# Patient Record
Sex: FEMALE | Race: BLACK OR AFRICAN AMERICAN | Employment: FULL TIME | URBAN - METROPOLITAN AREA
[De-identification: names, ages, dates, MRNs, and addresses within clinical notes are randomized per-mention and may not be internally consistent; named-entity substitution may affect disease eponyms.]

---

## 2022-01-07 DIAGNOSIS — Z01.419 WELL FEMALE EXAM WITH ROUTINE GYNECOLOGICAL EXAM: ICD-10-CM

## 2022-01-07 DIAGNOSIS — Z12.4 SCREENING FOR CERVICAL CANCER: ICD-10-CM

## 2023-10-16 ENCOUNTER — OFFICE VISIT (OUTPATIENT)
Dept: PRIMARY CARE CLINIC | Age: 43
End: 2023-10-16
Payer: COMMERCIAL

## 2023-10-16 VITALS
BODY MASS INDEX: 38.99 KG/M2 | DIASTOLIC BLOOD PRESSURE: 84 MMHG | WEIGHT: 248.4 LBS | OXYGEN SATURATION: 100 % | HEART RATE: 81 BPM | SYSTOLIC BLOOD PRESSURE: 126 MMHG | HEIGHT: 67 IN

## 2023-10-16 DIAGNOSIS — J45.20 MILD INTERMITTENT ASTHMA WITHOUT COMPLICATION: Primary | ICD-10-CM

## 2023-10-16 PROCEDURE — 1036F TOBACCO NON-USER: CPT | Performed by: STUDENT IN AN ORGANIZED HEALTH CARE EDUCATION/TRAINING PROGRAM

## 2023-10-16 PROCEDURE — G8427 DOCREV CUR MEDS BY ELIG CLIN: HCPCS | Performed by: STUDENT IN AN ORGANIZED HEALTH CARE EDUCATION/TRAINING PROGRAM

## 2023-10-16 PROCEDURE — G8417 CALC BMI ABV UP PARAM F/U: HCPCS | Performed by: STUDENT IN AN ORGANIZED HEALTH CARE EDUCATION/TRAINING PROGRAM

## 2023-10-16 PROCEDURE — 90674 CCIIV4 VAC NO PRSV 0.5 ML IM: CPT | Performed by: STUDENT IN AN ORGANIZED HEALTH CARE EDUCATION/TRAINING PROGRAM

## 2023-10-16 PROCEDURE — 99213 OFFICE O/P EST LOW 20 MIN: CPT | Performed by: STUDENT IN AN ORGANIZED HEALTH CARE EDUCATION/TRAINING PROGRAM

## 2023-10-16 PROCEDURE — G8482 FLU IMMUNIZE ORDER/ADMIN: HCPCS | Performed by: STUDENT IN AN ORGANIZED HEALTH CARE EDUCATION/TRAINING PROGRAM

## 2023-10-16 PROCEDURE — 90471 IMMUNIZATION ADMIN: CPT | Performed by: STUDENT IN AN ORGANIZED HEALTH CARE EDUCATION/TRAINING PROGRAM

## 2023-10-16 RX ORDER — CETIRIZINE HYDROCHLORIDE 10 MG/1
10 TABLET ORAL DAILY
COMMUNITY

## 2023-10-16 RX ORDER — ALBUTEROL SULFATE 90 UG/1
2 AEROSOL, METERED RESPIRATORY (INHALATION) 4 TIMES DAILY PRN
Qty: 18 G | Refills: 0 | Status: SHIPPED | OUTPATIENT
Start: 2023-10-16

## 2023-10-16 NOTE — PROGRESS NOTES
[address] [facility] PRIMARY AND WALK-IN CARE  [facility]  Nino Macdonald [place] [address] [place] [address]  Dept: [phone]  Dept Fax: [phone]  Loc: [phone]     10/16/2023    Visit type: walk in     Reason for Visit: Shortness of Breath (Had an episode at school with perfumes made her to where she could not breathe)       ASSESSMENT/PLAN   1. Mild intermittent asthma without complication  Assessment & Plan:   Acute , new issue   - no hx of official diagnosis --> PFT ordered   - hx of exercise induced asthma when younger   - has been having whats described as asthma attacks--> albuterol inhaler rescue ordered   Orders:  -     Spirometry With Bronchodilator; Future  -     albuterol sulfate HFA (VENTOLIN HFA) 108 (90 Base) MCG/ACT inhaler; Inhale 2 puffs into the lungs 4 times daily as needed for Wheezing, Disp-18 g, R-0Normal    No follow-ups on file. Orders Placed This Encounter   Medications    albuterol sulfate HFA (VENTOLIN HFA) 108 (90 Base) MCG/ACT inhaler     Sig: Inhale 2 puffs into the lungs 4 times daily as needed for Wheezing     Dispense:  18 g     Refill:  0      Subjective    Patient: Gerson Canada is a 37 y.o. female   HPI:    Has hx of seasonal allergies. Takes zytec and flonase once a day. 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 Takes hours to get back to normal. Feels like she cant breath during attacks. Has hx of sports induced asthma. Does not have inhalers at home. Review of Systems   All other systems reviewed and are negative. Physical Exam  Constitutional:       Appearance: Normal appearance. She is obese. Cardiovascular:      Rate and Rhythm: Normal rate. Pulses: Normal pulses. Pulmonary:      Effort: Pulmonary effort is normal.      Breath sounds: No wheezing. Neurological:      Mental Status: She is alert.        Allergies   Allergen Reactions    Latex Itching       Current

## 2023-10-16 NOTE — ASSESSMENT & PLAN NOTE
Acute , new issue   - no hx of official diagnosis --> PFT ordered   - hx of exercise induced asthma when younger   - has been having whats described as asthma attacks--> albuterol inhaler rescue ordered

## 2024-04-09 ENCOUNTER — OFFICE VISIT (OUTPATIENT)
Dept: FAMILY MEDICINE CLINIC | Age: 44
End: 2024-04-09
Payer: COMMERCIAL

## 2024-04-09 VITALS
DIASTOLIC BLOOD PRESSURE: 78 MMHG | HEART RATE: 84 BPM | WEIGHT: 248 LBS | BODY MASS INDEX: 38.92 KG/M2 | TEMPERATURE: 97.3 F | HEIGHT: 67 IN | SYSTOLIC BLOOD PRESSURE: 132 MMHG | OXYGEN SATURATION: 100 %

## 2024-04-09 DIAGNOSIS — Z13.220 LIPID SCREENING: ICD-10-CM

## 2024-04-09 DIAGNOSIS — R73.03 PREDIABETES: ICD-10-CM

## 2024-04-09 DIAGNOSIS — E55.9 VITAMIN D DEFICIENCY: Primary | ICD-10-CM

## 2024-04-09 DIAGNOSIS — N89.8 VAGINAL DRYNESS: ICD-10-CM

## 2024-04-09 DIAGNOSIS — E66.09 CLASS 2 OBESITY DUE TO EXCESS CALORIES WITH BODY MASS INDEX (BMI) OF 38.0 TO 38.9 IN ADULT, UNSPECIFIED WHETHER SERIOUS COMORBIDITY PRESENT: ICD-10-CM

## 2024-04-09 DIAGNOSIS — E55.9 VITAMIN D DEFICIENCY: ICD-10-CM

## 2024-04-09 DIAGNOSIS — N95.1 PERIMENOPAUSAL: ICD-10-CM

## 2024-04-09 DIAGNOSIS — F32.A DEPRESSION, UNSPECIFIED DEPRESSION TYPE: ICD-10-CM

## 2024-04-09 DIAGNOSIS — E61.1 IRON DEFICIENCY: ICD-10-CM

## 2024-04-09 LAB
ALBUMIN SERPL-MCNC: 4.3 G/DL (ref 3.5–4.6)
ALP SERPL-CCNC: 89 U/L (ref 40–130)
ALT SERPL-CCNC: 18 U/L (ref 0–33)
ANION GAP SERPL CALCULATED.3IONS-SCNC: 11 MEQ/L (ref 9–15)
AST SERPL-CCNC: 15 U/L (ref 0–35)
BASOPHILS # BLD: 0.1 K/UL (ref 0–0.2)
BASOPHILS NFR BLD: 0.5 %
BILIRUB SERPL-MCNC: 0.3 MG/DL (ref 0.2–0.7)
BUN SERPL-MCNC: 10 MG/DL (ref 6–20)
CALCIUM SERPL-MCNC: 9.1 MG/DL (ref 8.5–9.9)
CHLORIDE SERPL-SCNC: 103 MEQ/L (ref 95–107)
CHOLEST SERPL-MCNC: 178 MG/DL (ref 0–199)
CO2 SERPL-SCNC: 22 MEQ/L (ref 20–31)
CREAT SERPL-MCNC: 0.74 MG/DL (ref 0.5–0.9)
EOSINOPHIL # BLD: 0.1 K/UL (ref 0–0.7)
EOSINOPHIL NFR BLD: 1.3 %
ERYTHROCYTE [DISTWIDTH] IN BLOOD BY AUTOMATED COUNT: 14.9 % (ref 11.5–14.5)
GLOBULIN SER CALC-MCNC: 3.2 G/DL (ref 2.3–3.5)
GLUCOSE SERPL-MCNC: 88 MG/DL (ref 70–99)
HCT VFR BLD AUTO: 39.4 % (ref 37–47)
HDLC SERPL-MCNC: 59 MG/DL (ref 40–59)
HGB BLD-MCNC: 12.5 G/DL (ref 12–16)
LDL CHOLESTEROL CALCULATED: 100 MG/DL (ref 0–129)
LYMPHOCYTES # BLD: 3 K/UL (ref 1–4.8)
LYMPHOCYTES NFR BLD: 28.7 %
MCH RBC QN AUTO: 25.9 PG (ref 27–31.3)
MCHC RBC AUTO-ENTMCNC: 31.7 % (ref 33–37)
MCV RBC AUTO: 81.6 FL (ref 79.4–94.8)
MONOCYTES # BLD: 0.6 K/UL (ref 0.2–0.8)
MONOCYTES NFR BLD: 5.6 %
NEUTROPHILS # BLD: 6.5 K/UL (ref 1.4–6.5)
NEUTS SEG NFR BLD: 63.3 %
PLATELET # BLD AUTO: 293 K/UL (ref 130–400)
POTASSIUM SERPL-SCNC: 4.2 MEQ/L (ref 3.4–4.9)
PROT SERPL-MCNC: 7.5 G/DL (ref 6.3–8)
RBC # BLD AUTO: 4.83 M/UL (ref 4.2–5.4)
SODIUM SERPL-SCNC: 136 MEQ/L (ref 135–144)
TRIGLYCERIDE, FASTING: 93 MG/DL (ref 0–150)
WBC # BLD AUTO: 10.3 K/UL (ref 4.8–10.8)

## 2024-04-09 PROCEDURE — G8417 CALC BMI ABV UP PARAM F/U: HCPCS | Performed by: FAMILY MEDICINE

## 2024-04-09 PROCEDURE — 1036F TOBACCO NON-USER: CPT | Performed by: FAMILY MEDICINE

## 2024-04-09 PROCEDURE — G8427 DOCREV CUR MEDS BY ELIG CLIN: HCPCS | Performed by: FAMILY MEDICINE

## 2024-04-09 PROCEDURE — 99214 OFFICE O/P EST MOD 30 MIN: CPT | Performed by: FAMILY MEDICINE

## 2024-04-09 RX ORDER — AMLODIPINE BESYLATE 5 MG/1
5 TABLET ORAL DAILY
COMMUNITY
Start: 2024-04-05 | End: 2024-04-09 | Stop reason: SDUPTHER

## 2024-04-09 RX ORDER — AMLODIPINE BESYLATE 5 MG/1
5 TABLET ORAL DAILY
Qty: 30 TABLET | Refills: 2 | Status: SHIPPED | OUTPATIENT
Start: 2024-04-09 | End: 2024-07-08

## 2024-04-09 ASSESSMENT — PATIENT HEALTH QUESTIONNAIRE - PHQ9
6. FEELING BAD ABOUT YOURSELF - OR THAT YOU ARE A FAILURE OR HAVE LET YOURSELF OR YOUR FAMILY DOWN: MORE THAN HALF THE DAYS
4. FEELING TIRED OR HAVING LITTLE ENERGY: MORE THAN HALF THE DAYS
9. THOUGHTS THAT YOU WOULD BE BETTER OFF DEAD, OR OF HURTING YOURSELF: NOT AT ALL
SUM OF ALL RESPONSES TO PHQ QUESTIONS 1-9: 12
SUM OF ALL RESPONSES TO PHQ QUESTIONS 1-9: 12
SUM OF ALL RESPONSES TO PHQ9 QUESTIONS 1 & 2: 4
1. LITTLE INTEREST OR PLEASURE IN DOING THINGS: MORE THAN HALF THE DAYS
8. MOVING OR SPEAKING SO SLOWLY THAT OTHER PEOPLE COULD HAVE NOTICED. OR THE OPPOSITE - BEING SO FIDGETY OR RESTLESS THAT YOU HAVE BEEN MOVING AROUND A LOT MORE THAN USUAL: NOT AT ALL
9. THOUGHTS THAT YOU WOULD BE BETTER OFF DEAD, OR OF HURTING YOURSELF: NOT AT ALL
SUM OF ALL RESPONSES TO PHQ QUESTIONS 1-9: 12
SUM OF ALL RESPONSES TO PHQ QUESTIONS 1-9: 12
5. POOR APPETITE OR OVEREATING: SEVERAL DAYS
8. MOVING OR SPEAKING SO SLOWLY THAT OTHER PEOPLE COULD HAVE NOTICED. OR THE OPPOSITE, BEING SO FIGETY OR RESTLESS THAT YOU HAVE BEEN MOVING AROUND A LOT MORE THAN USUAL: NOT AT ALL
4. FEELING TIRED OR HAVING LITTLE ENERGY: MORE THAN HALF THE DAYS
1. LITTLE INTEREST OR PLEASURE IN DOING THINGS: MORE THAN HALF THE DAYS
2. FEELING DOWN, DEPRESSED OR HOPELESS: MORE THAN HALF THE DAYS
SUM OF ALL RESPONSES TO PHQ9 QUESTIONS 1 & 2: 4
7. TROUBLE CONCENTRATING ON THINGS, SUCH AS READING THE NEWSPAPER OR WATCHING TELEVISION: SEVERAL DAYS
7. TROUBLE CONCENTRATING ON THINGS, SUCH AS READING THE NEWSPAPER OR WATCHING TELEVISION: SEVERAL DAYS
10. IF YOU CHECKED OFF ANY PROBLEMS, HOW DIFFICULT HAVE THESE PROBLEMS MADE IT FOR YOU TO DO YOUR WORK, TAKE CARE OF THINGS AT HOME, OR GET ALONG WITH OTHER PEOPLE: VERY DIFFICULT
3. TROUBLE FALLING OR STAYING ASLEEP: MORE THAN HALF THE DAYS
2. FEELING DOWN, DEPRESSED OR HOPELESS: MORE THAN HALF THE DAYS
SUM OF ALL RESPONSES TO PHQ QUESTIONS 1-9: 12
6. FEELING BAD ABOUT YOURSELF - OR THAT YOU ARE A FAILURE OR HAVE LET YOURSELF OR YOUR FAMILY DOWN: MORE THAN HALF THE DAYS
3. TROUBLE FALLING OR STAYING ASLEEP: MORE THAN HALF THE DAYS
5. POOR APPETITE OR OVEREATING: SEVERAL DAYS
10. IF YOU CHECKED OFF ANY PROBLEMS, HOW DIFFICULT HAVE THESE PROBLEMS MADE IT FOR YOU TO DO YOUR WORK, TAKE CARE OF THINGS AT HOME, OR GET ALONG WITH OTHER PEOPLE: VERY DIFFICULT

## 2024-04-09 NOTE — PROGRESS NOTES
Chief Complaint   Patient presents with    Hypertension     Patient saw  at [place] 4/5/24 with bp of 240/140. Has started to monitor bp this weekend, 160s/90-100s. She has been having light heart palpitations, dizziness, and headaches with the high bp. Bp in office 132/78, she took amlodipine this morning        HPI: Ashley Krishna 43 y.o. female presenting for     ED follow-up  Patient was seen at [facility] for elevated blood pressures.  Patient reports she was at the school when she started to notice heart palpitations and dizziness.  School nurse checked her blood pressure and found it to be very high.  Subsequently patient was sent to the emergency department.  Workup in the ED was negative.  Patient was started on amlodipine 5 mg daily.  Patient reports since doing on the medication she has noticed the palpitations improved.  Denies any lower extremity edema.    Not feeling herself  Patient reports that she has not been feeling herself.  Admits that her body has been aching and feeling itchy.  Admits to irritability and fatigue.  Patient also reports of hot flashes and vaginal dryness.  Patient did have a hysterectomy several years ago where the uterus was removed but the ovaries were retained.  Patient reports her symptoms been going on for the last year and have been worsening.  Patient does admit to depressed mood which has worsened over the last 3 months.  Patient is unsure when her mother went through menopause.      Asthma attack  Patient reports that has been tach when she was exposed that she perfume in the school.  Using albuterol inhaler as needed.      Iron deficiency 2/2 to Vaginal bleeding s/p hysterectomy   Hysterectomy in march due to vaginal bleeding   Patient would like to make sure that her levels are good.      F/u   Would like iron levels checked     Mitral valve prolapse   Had an EKG   No issues       Current Outpatient Medications   Medication Sig Dispense Refill

## 2024-04-10 LAB
ESTIMATED AVERAGE GLUCOSE: 117 MG/DL
FERRITIN: 53 NG/ML (ref 13–150)
HBA1C MFR BLD: 5.7 % (ref 4–6)
IRON % SATURATION: 27 % (ref 20–55)
IRON: 83 UG/DL (ref 37–145)
TOTAL IRON BINDING CAPACITY: 305 UG/DL (ref 250–450)
UNSATURATED IRON BINDING CAPACITY: 222 UG/DL (ref 112–347)
VITAMIN D 25-HYDROXY: 24.8 NG/ML (ref 30–100)

## 2024-04-18 ENCOUNTER — TELEPHONE (OUTPATIENT)
Dept: FAMILY MEDICINE CLINIC | Age: 44
End: 2024-04-18

## 2024-04-18 NOTE — TELEPHONE ENCOUNTER
----- Message from Andree Tompkins sent at 4/18/2024  9:45 AM EDT -----    ----- Message -----  From: Caty Luevano MD  Sent: 4/18/2024   9:40 AM EDT  To: Grecia Hollins  Clinical Staff    Please call lab, for some reason patient hormonal levels were not done.  Please see if there was any reason for why they were not done with the rest of her blood work.  Thanks    Please let patient know that she may need to get her blood work done for the hormone levels (depending on what lab says)

## 2024-04-18 NOTE — TELEPHONE ENCOUNTER
Spoke with Adrian from lab, it was an issue on their end with their software crossing from ours to theirs, Adrian will call the patient to come back and have those redrawn.

## 2024-04-22 LAB
PROLACTIN SERPL-MCNC: 11.7 NG/ML
TSH REFLEX: 1.15 UIU/ML (ref 0.44–3.86)

## 2024-04-23 LAB
FOLLICLE STIMULATING HORMONE: 23.9 MIU/ML
LH: 11.4 MIU/ML (ref 1.7–8.6)

## 2024-04-24 DIAGNOSIS — N95.1 PERIMENOPAUSAL SYMPTOMS: Primary | ICD-10-CM

## 2024-04-24 RX ORDER — ESTRADIOL 0.03 MG/D
1 FILM, EXTENDED RELEASE TRANSDERMAL
Qty: 8 PATCH | Refills: 3 | Status: SHIPPED | OUTPATIENT
Start: 2024-04-25

## 2024-05-10 DIAGNOSIS — N95.1 PERIMENOPAUSAL SYMPTOMS: ICD-10-CM

## 2024-05-10 RX ORDER — ESTRADIOL 0.03 MG/D
1 FILM, EXTENDED RELEASE TRANSDERMAL
Qty: 8 PATCH | Refills: 3 | Status: SHIPPED | OUTPATIENT
Start: 2024-05-13

## 2024-05-17 ENCOUNTER — OFFICE VISIT (OUTPATIENT)
Dept: OBGYN CLINIC | Age: 44
End: 2024-05-17
Payer: COMMERCIAL

## 2024-05-17 VITALS
SYSTOLIC BLOOD PRESSURE: 146 MMHG | WEIGHT: 252 LBS | HEIGHT: 67 IN | DIASTOLIC BLOOD PRESSURE: 94 MMHG | BODY MASS INDEX: 39.55 KG/M2

## 2024-05-17 DIAGNOSIS — R03.0 ELEVATED BP WITHOUT DIAGNOSIS OF HYPERTENSION: ICD-10-CM

## 2024-05-17 DIAGNOSIS — R23.2 HOT FLASHES: Primary | ICD-10-CM

## 2024-05-17 DIAGNOSIS — R52 BODY ACHES: ICD-10-CM

## 2024-05-17 PROCEDURE — 99213 OFFICE O/P EST LOW 20 MIN: CPT | Performed by: OBSTETRICS & GYNECOLOGY

## 2024-05-17 PROCEDURE — 1036F TOBACCO NON-USER: CPT | Performed by: OBSTETRICS & GYNECOLOGY

## 2024-05-17 PROCEDURE — G8417 CALC BMI ABV UP PARAM F/U: HCPCS | Performed by: OBSTETRICS & GYNECOLOGY

## 2024-05-17 PROCEDURE — G8427 DOCREV CUR MEDS BY ELIG CLIN: HCPCS | Performed by: OBSTETRICS & GYNECOLOGY

## 2024-06-06 ENCOUNTER — OFFICE VISIT (OUTPATIENT)
Dept: FAMILY MEDICINE CLINIC | Age: 44
End: 2024-06-06
Payer: COMMERCIAL

## 2024-06-06 VITALS
HEIGHT: 67 IN | DIASTOLIC BLOOD PRESSURE: 62 MMHG | TEMPERATURE: 97 F | BODY MASS INDEX: 40.18 KG/M2 | OXYGEN SATURATION: 98 % | SYSTOLIC BLOOD PRESSURE: 126 MMHG | WEIGHT: 256 LBS | HEART RATE: 86 BPM

## 2024-06-06 DIAGNOSIS — R73.03 PREDIABETES: ICD-10-CM

## 2024-06-06 DIAGNOSIS — I10 HYPERTENSION, UNSPECIFIED TYPE: ICD-10-CM

## 2024-06-06 DIAGNOSIS — R52 BODY ACHES: ICD-10-CM

## 2024-06-06 DIAGNOSIS — N89.8 VAGINAL DRYNESS: ICD-10-CM

## 2024-06-06 DIAGNOSIS — N95.1 PERIMENOPAUSAL: ICD-10-CM

## 2024-06-06 DIAGNOSIS — R03.0 ELEVATED BP WITHOUT DIAGNOSIS OF HYPERTENSION: ICD-10-CM

## 2024-06-06 DIAGNOSIS — R23.2 HOT FLASHES: ICD-10-CM

## 2024-06-06 DIAGNOSIS — E55.9 VITAMIN D DEFICIENCY: ICD-10-CM

## 2024-06-06 DIAGNOSIS — E66.09 CLASS 2 OBESITY DUE TO EXCESS CALORIES WITH BODY MASS INDEX (BMI) OF 38.0 TO 38.9 IN ADULT, UNSPECIFIED WHETHER SERIOUS COMORBIDITY PRESENT: ICD-10-CM

## 2024-06-06 DIAGNOSIS — E61.1 IRON DEFICIENCY: ICD-10-CM

## 2024-06-06 DIAGNOSIS — M25.472 LEFT ANKLE SWELLING: ICD-10-CM

## 2024-06-06 DIAGNOSIS — E55.9 VITAMIN D DEFICIENCY: Primary | ICD-10-CM

## 2024-06-06 LAB — VITAMIN D 25-HYDROXY: 30.8 NG/ML (ref 30–100)

## 2024-06-06 PROCEDURE — 1036F TOBACCO NON-USER: CPT | Performed by: FAMILY MEDICINE

## 2024-06-06 PROCEDURE — 3078F DIAST BP <80 MM HG: CPT | Performed by: FAMILY MEDICINE

## 2024-06-06 PROCEDURE — G8427 DOCREV CUR MEDS BY ELIG CLIN: HCPCS | Performed by: FAMILY MEDICINE

## 2024-06-06 PROCEDURE — 99214 OFFICE O/P EST MOD 30 MIN: CPT | Performed by: FAMILY MEDICINE

## 2024-06-06 PROCEDURE — G8417 CALC BMI ABV UP PARAM F/U: HCPCS | Performed by: FAMILY MEDICINE

## 2024-06-06 PROCEDURE — 3074F SYST BP LT 130 MM HG: CPT | Performed by: FAMILY MEDICINE

## 2024-06-06 RX ORDER — ESTRADIOL 0.05 MG/D
1 PATCH, EXTENDED RELEASE TRANSDERMAL
Qty: 8 PATCH | Refills: 3 | Status: CANCELLED | OUTPATIENT
Start: 2024-06-06

## 2024-06-06 RX ORDER — BECLOMETHASONE DIPROPIONATE HFA 40 UG/1
1 AEROSOL, METERED RESPIRATORY (INHALATION)
COMMUNITY
Start: 2024-04-29

## 2024-06-06 RX ORDER — AMLODIPINE BESYLATE 10 MG/1
10 TABLET ORAL DAILY
Qty: 90 TABLET | Refills: 1 | Status: SHIPPED | OUTPATIENT
Start: 2024-06-06

## 2024-06-06 NOTE — PROGRESS NOTES
Chief Complaint   Patient presents with    Follow-up     2 month follow-up, patient reports same issues since last visit.     Health Maintenance     Yes to mammogram        HPI: Ashley Krishna 43 y.o. female presenting for     Hypertension   Stable on the increased dose of amlodipine  Currently on 10 mg daily  BP Readings from Last 29 Encounters:   06/06/24 126/62   05/17/24 (!) 146/94   04/09/24 132/78   10/16/23 126/84   07/06/23 134/82   06/08/23 128/76   10/10/22 118/74   09/13/22 124/74   08/16/22 130/86   04/08/22 114/72   03/16/22 112/84   03/09/22 132/73   03/07/22 (!) 152/94   03/01/22 (!) 154/72   02/24/22 130/78   01/06/22 126/64   03/06/21 (!) 163/101   08/05/18 (!) 156/84   ]    Not feeling herself  Patient reports that she has not been feeling herself.  Admits that her body has been aching and feeling itchy.  Admits to irritability and fatigue.  Patient also reports of hot flashes and vaginal dryness.  Patient did have a hysterectomy several years ago where the uterus was removed but the ovaries were retained.  Patient reports her symptoms been going on for the last year and have been worsening.  Patient does admit to depressed mood which has worsened over the last 3 months.  Patient is unsure when her mother went through menopause.    F/u   No change   Did see gyn, wants her to do more blood work before they think about starting estrogen patch    Left ankle swelling  Patient is complaining of ankle pain and swollen for 1 year   Patinet reports that she feels it more sore in the morning and in the evening and goes away.   Does not feel like a sprain.   Patient was started on amlodipine way after this happened.     Asthma attack  Patient reports that has been tach when she was exposed that she perfume in the school.  Using albuterol inhaler as needed.  F/u   Stable     Iron deficiency 2/2 to Vaginal bleeding s/p hysterectomy   Hysterectomy in march due to vaginal bleeding   Patient would like to

## 2024-06-07 LAB
CORTISOL COLLECTION INFO: NORMAL
CORTISOL: 6.9 UG/DL (ref 2.5–19.5)
ESTRADIOL LEVEL: 40 PG/ML

## 2024-06-08 LAB — NUCLEAR IGG SER QL IA: NORMAL

## 2024-06-09 LAB — THYROPEROXIDASE IGG SERPL-ACNC: <4 IU/ML (ref 0–25)

## 2024-06-10 DIAGNOSIS — M25.472 LEFT ANKLE SWELLING: Primary | ICD-10-CM

## 2024-06-22 SDOH — ECONOMIC STABILITY: FOOD INSECURITY: WITHIN THE PAST 12 MONTHS, YOU WORRIED THAT YOUR FOOD WOULD RUN OUT BEFORE YOU GOT MONEY TO BUY MORE.: NEVER TRUE

## 2024-06-22 SDOH — ECONOMIC STABILITY: TRANSPORTATION INSECURITY
IN THE PAST 12 MONTHS, HAS LACK OF TRANSPORTATION KEPT YOU FROM MEETINGS, WORK, OR FROM GETTING THINGS NEEDED FOR DAILY LIVING?: NO

## 2024-06-22 SDOH — ECONOMIC STABILITY: INCOME INSECURITY: HOW HARD IS IT FOR YOU TO PAY FOR THE VERY BASICS LIKE FOOD, HOUSING, MEDICAL CARE, AND HEATING?: NOT VERY HARD

## 2024-06-22 SDOH — ECONOMIC STABILITY: HOUSING INSECURITY
IN THE LAST 12 MONTHS, WAS THERE A TIME WHEN YOU DID NOT HAVE A STEADY PLACE TO SLEEP OR SLEPT IN A SHELTER (INCLUDING NOW)?: NO

## 2024-06-22 SDOH — ECONOMIC STABILITY: FOOD INSECURITY: WITHIN THE PAST 12 MONTHS, THE FOOD YOU BOUGHT JUST DIDN'T LAST AND YOU DIDN'T HAVE MONEY TO GET MORE.: NEVER TRUE

## 2024-06-25 ENCOUNTER — OFFICE VISIT (OUTPATIENT)
Dept: OBGYN CLINIC | Age: 44
End: 2024-06-25
Payer: COMMERCIAL

## 2024-06-25 VITALS
BODY MASS INDEX: 40.02 KG/M2 | WEIGHT: 255 LBS | HEIGHT: 67 IN | SYSTOLIC BLOOD PRESSURE: 122 MMHG | DIASTOLIC BLOOD PRESSURE: 76 MMHG

## 2024-06-25 DIAGNOSIS — R23.2 HOT FLASHES: Primary | ICD-10-CM

## 2024-06-25 PROCEDURE — 99212 OFFICE O/P EST SF 10 MIN: CPT | Performed by: OBSTETRICS & GYNECOLOGY

## 2024-06-25 PROCEDURE — G8417 CALC BMI ABV UP PARAM F/U: HCPCS | Performed by: OBSTETRICS & GYNECOLOGY

## 2024-06-25 PROCEDURE — G8427 DOCREV CUR MEDS BY ELIG CLIN: HCPCS | Performed by: OBSTETRICS & GYNECOLOGY

## 2024-06-25 PROCEDURE — 1036F TOBACCO NON-USER: CPT | Performed by: OBSTETRICS & GYNECOLOGY

## 2024-07-29 ENCOUNTER — INITIAL CONSULT (OUTPATIENT)
Dept: PODIATRY | Age: 44
End: 2024-07-29
Payer: COMMERCIAL

## 2024-07-29 VITALS — BODY MASS INDEX: 40.81 KG/M2 | TEMPERATURE: 97.9 F | HEIGHT: 67 IN | WEIGHT: 260 LBS

## 2024-07-29 DIAGNOSIS — M76.822 POSTERIOR TIBIAL TENDON DYSFUNCTION (PTTD) OF LEFT LOWER EXTREMITY: ICD-10-CM

## 2024-07-29 DIAGNOSIS — M79.672 PAIN IN BOTH FEET: Primary | ICD-10-CM

## 2024-07-29 DIAGNOSIS — M19.071 ARTHRITIS OF BOTH FEET: ICD-10-CM

## 2024-07-29 DIAGNOSIS — M65.872 OTHER SYNOVITIS AND TENOSYNOVITIS, LEFT ANKLE AND FOOT: ICD-10-CM

## 2024-07-29 DIAGNOSIS — M21.42 PES PLANUS OF BOTH FEET: ICD-10-CM

## 2024-07-29 DIAGNOSIS — M79.671 PAIN IN BOTH FEET: Primary | ICD-10-CM

## 2024-07-29 DIAGNOSIS — M19.072 ARTHRITIS OF BOTH FEET: ICD-10-CM

## 2024-07-29 DIAGNOSIS — M21.41 PES PLANUS OF BOTH FEET: ICD-10-CM

## 2024-07-29 PROCEDURE — 99203 OFFICE O/P NEW LOW 30 MIN: CPT | Performed by: PODIATRIST

## 2024-07-29 PROCEDURE — 1036F TOBACCO NON-USER: CPT | Performed by: PODIATRIST

## 2024-07-29 PROCEDURE — G8417 CALC BMI ABV UP PARAM F/U: HCPCS | Performed by: PODIATRIST

## 2024-07-29 PROCEDURE — G8427 DOCREV CUR MEDS BY ELIG CLIN: HCPCS | Performed by: PODIATRIST

## 2024-07-29 RX ORDER — METHYLPREDNISOLONE 4 MG/1
TABLET ORAL
Qty: 1 KIT | Refills: 0 | Status: SHIPPED | OUTPATIENT
Start: 2024-07-29

## 2024-07-29 ASSESSMENT — ENCOUNTER SYMPTOMS
NAUSEA: 0
SHORTNESS OF BREATH: 0
BACK PAIN: 1
VOMITING: 0

## 2024-07-30 RX ORDER — AMLODIPINE BESYLATE 5 MG/1
5 TABLET ORAL DAILY
Qty: 90 TABLET | Refills: 0 | OUTPATIENT
Start: 2024-07-30

## 2024-07-30 NOTE — PROGRESS NOTES
[facility] PHYSICIANS [place] SPECIALTY CARE, [facility] PODIATRY  [address] [facility] [address]  Dept: [phone]  Loc: [phone]       Ashley Krishna  (1980)    7/29/24    Subjective     Ashley Krishna is 44 y.o. female who complains today of:    Chief Complaint   Patient presents with    Ankle Pain     Left ankle     Ashley Krishna is seen in consultation at the request of Caty Luevano MD for evaluation of foot pain.     Ankle Pain   Pertinent negatives include no numbness.     HPI: Patient presents with a complaint of bilateral foot pain, she states the left is significantly more painful than the right.  Patient points to the medial rear foot and the ankle as the areas of pain.  Patient describes having stiffness and achiness to the joints with shooting pain during activity.  Patient also reports having swelling of the lower extremities after prolonged standing.  Patient works as a  at a middle school, this requires her to stand for extended periods.  Patient rates her pain level as 7/10.  Patient states that the symptoms have been present for the past 4 years but have been waxing and waning.  Overall, she states that the pain level has worsened over time.  Patient does not recall injury but states she was very active in track.  Prior treatments included medication, inserts and a compression wrap.  Patient states she has not had custom orthotics or braces in the past.    Review of Systems   Constitutional:  Negative for chills and fever.   HENT:  Negative for hearing loss.    Respiratory:  Negative for shortness of breath.    Cardiovascular:  Negative for chest pain.   Gastrointestinal:  Negative for nausea and vomiting.   Genitourinary:  Negative for difficulty urinating.   Musculoskeletal:  Positive for arthralgias, back pain and gait problem.   Skin:  Negative for wound.   Neurological:  Negative for numbness.

## 2024-08-14 ENCOUNTER — TELEPHONE (OUTPATIENT)
Dept: PODIATRY | Age: 44
End: 2024-08-14

## 2024-08-25 ENCOUNTER — PATIENT MESSAGE (OUTPATIENT)
Dept: OBGYN CLINIC | Age: 44
End: 2024-08-25

## 2024-08-26 NOTE — TELEPHONE ENCOUNTER
Requested Prescriptions     Pending Prescriptions Disp Refills    norethindrone-ethinyl estradiol-Fe (LO LOESTRIN FE) 1 MG-10 MCG / 10 MCG tablet 1 packet 5     Sig: Take 1 tablet by mouth daily

## 2024-09-18 ENCOUNTER — OFFICE VISIT (OUTPATIENT)
Dept: PODIATRY | Age: 44
End: 2024-09-18
Payer: COMMERCIAL

## 2024-09-18 VITALS — BODY MASS INDEX: 40.81 KG/M2 | WEIGHT: 260 LBS | HEIGHT: 67 IN | TEMPERATURE: 97.5 F

## 2024-09-18 DIAGNOSIS — M19.071 ARTHRITIS OF BOTH FEET: ICD-10-CM

## 2024-09-18 DIAGNOSIS — M21.41 PES PLANUS OF BOTH FEET: ICD-10-CM

## 2024-09-18 DIAGNOSIS — M79.672 PAIN IN BOTH FEET: Primary | ICD-10-CM

## 2024-09-18 DIAGNOSIS — M21.42 PES PLANUS OF BOTH FEET: ICD-10-CM

## 2024-09-18 DIAGNOSIS — M19.072 ARTHRITIS OF BOTH FEET: ICD-10-CM

## 2024-09-18 DIAGNOSIS — M76.822 POSTERIOR TIBIAL TENDON DYSFUNCTION (PTTD) OF LEFT LOWER EXTREMITY: ICD-10-CM

## 2024-09-18 DIAGNOSIS — M79.671 PAIN IN BOTH FEET: Primary | ICD-10-CM

## 2024-09-18 PROCEDURE — 99213 OFFICE O/P EST LOW 20 MIN: CPT | Performed by: PODIATRIST

## 2024-09-18 PROCEDURE — G8417 CALC BMI ABV UP PARAM F/U: HCPCS | Performed by: PODIATRIST

## 2024-09-18 PROCEDURE — 1036F TOBACCO NON-USER: CPT | Performed by: PODIATRIST

## 2024-09-18 PROCEDURE — G8427 DOCREV CUR MEDS BY ELIG CLIN: HCPCS | Performed by: PODIATRIST

## 2024-09-18 ASSESSMENT — ENCOUNTER SYMPTOMS
SHORTNESS OF BREATH: 0
NAUSEA: 0
VOMITING: 0
BACK PAIN: 1

## 2024-10-04 ENCOUNTER — OFFICE VISIT (OUTPATIENT)
Dept: FAMILY MEDICINE CLINIC | Age: 44
End: 2024-10-04

## 2024-10-04 VITALS
OXYGEN SATURATION: 99 % | SYSTOLIC BLOOD PRESSURE: 126 MMHG | DIASTOLIC BLOOD PRESSURE: 82 MMHG | TEMPERATURE: 97.1 F | BODY MASS INDEX: 41.12 KG/M2 | HEART RATE: 87 BPM | HEIGHT: 67 IN | WEIGHT: 262 LBS

## 2024-10-04 DIAGNOSIS — N95.1 PERIMENOPAUSAL: Primary | ICD-10-CM

## 2024-10-04 DIAGNOSIS — J30.9 ALLERGIC RHINITIS, UNSPECIFIED SEASONALITY, UNSPECIFIED TRIGGER: ICD-10-CM

## 2024-10-04 DIAGNOSIS — I10 HYPERTENSION, UNSPECIFIED TYPE: ICD-10-CM

## 2024-10-04 DIAGNOSIS — I34.1 MITRAL VALVE PROLAPSE: Chronic | ICD-10-CM

## 2024-10-04 DIAGNOSIS — Z12.31 ENCOUNTER FOR SCREENING MAMMOGRAM FOR MALIGNANT NEOPLASM OF BREAST: ICD-10-CM

## 2024-10-04 DIAGNOSIS — E61.1 IRON DEFICIENCY: ICD-10-CM

## 2024-10-04 DIAGNOSIS — Z23 NEED FOR INFLUENZA VACCINATION: ICD-10-CM

## 2024-10-04 NOTE — PROGRESS NOTES
working with the foot doctors was on steroids which did help.    Asthma attack  Patient reports that has been tach when she was exposed that she perfume in the school.  Using albuterol inhaler as needed.  F/u   Stable     Allergist   Clint reports taht her allergies are all years  Patient is taking flonase and zyrtec     Iron deficiency 2/2 to Vaginal bleeding s/p hysterectomy   Hysterectomy in march due to vaginal bleeding   Patient would like to make sure that her levels are good.      F/u   Stable.  Mitral valve prolapse   Had an EKG   No issues       Current Outpatient Medications   Medication Sig Dispense Refill    norethindrone-ethinyl estradiol-Fe (LO LOESTRIN FE) 1 MG-10 MCG / 10 MCG tablet Take 1 tablet by mouth daily 1 packet 5    fluconazole (DIFLUCAN) 150 MG tablet Take 1 tablet every 2 days for 3 doses. 3 tablet 0    methylPREDNISolone (MEDROL DOSEPACK) 4 MG tablet Take by mouth. 1 kit 0    diclofenac sodium (VOLTAREN) 1 % GEL Apply 4 g topically 4 times daily as needed for Pain 100 g 5    QVAR REDIHALER 40 MCG/ACT AERB inhaler Inhale 1 puff into the lungs in the morning and 1 puff in the evening.      amLODIPine (NORVASC) 10 MG tablet Take 1 tablet by mouth daily 90 tablet 1    estradiol 0.025 MG/24HR PTTW Place 1 patch onto the skin Twice a Week 8 patch 3    cetirizine (ZYRTEC) 10 MG tablet Take 1 tablet by mouth daily      fluticasone (VERAMYST) 27.5 MCG/SPRAY nasal spray 2 sprays by Each Nostril route daily      albuterol sulfate HFA (VENTOLIN HFA) 108 (90 Base) MCG/ACT inhaler Inhale 2 puffs into the lungs 4 times daily as needed for Wheezing 18 g 0    Cholecalciferol (VITAMIN D) 50 MCG (2000 UT) CAPS capsule Take by mouth daily      Multiple Vitamin TABS Take by mouth daily       No current facility-administered medications for this visit.        ROS  Per HPI    Past Medical History:   Diagnosis Date    Anxiety     Depression     Iron deficiency anemia     Mild intermittent asthma without

## 2024-12-10 DIAGNOSIS — I10 HYPERTENSION, UNSPECIFIED TYPE: ICD-10-CM

## 2024-12-10 RX ORDER — AMLODIPINE BESYLATE 10 MG/1
10 TABLET ORAL DAILY
Qty: 90 TABLET | Refills: 1 | Status: SHIPPED | OUTPATIENT
Start: 2024-12-10

## 2024-12-10 NOTE — TELEPHONE ENCOUNTER
Pharmacy requesting medication refill. Please approve or deny this request.    Rx requested:  Requested Prescriptions     Pending Prescriptions Disp Refills    amLODIPine (NORVASC) 10 MG tablet [Pharmacy Med Name: AMLODIPINE BESYLATE 10 MG TAB] 90 tablet 1     Sig: TAKE 1 TABLET BY MOUTH EVERY DAY         Last Office Visit:   10/4/2024      Next Visit Date:  Future Appointments   Date Time Provider Department Center   4/4/2025 12:00 PM Caty Luevano MD [facility] ECC DEP

## 2025-03-25 ENCOUNTER — TELEPHONE (OUTPATIENT)
Age: 45
End: 2025-03-25

## 2025-03-25 NOTE — TELEPHONE ENCOUNTER
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  Pt states BP reading was 154/100 and states she takes her medication daily.  Pt advised to go to ED to be evaluated and at time of call pt scheduled f/u w/ PCP   Dr Luevano 3/31/2025.  Pt had no further questions or concerns to be addressed at time of call and stated her  could come to her work to take her to ED.

## 2025-03-31 ENCOUNTER — OFFICE VISIT (OUTPATIENT)
Age: 45
End: 2025-03-31
Payer: COMMERCIAL

## 2025-03-31 VITALS
TEMPERATURE: 97.6 F | WEIGHT: 270 LBS | SYSTOLIC BLOOD PRESSURE: 142 MMHG | HEIGHT: 67 IN | HEART RATE: 87 BPM | BODY MASS INDEX: 42.38 KG/M2 | DIASTOLIC BLOOD PRESSURE: 80 MMHG | OXYGEN SATURATION: 97 %

## 2025-03-31 DIAGNOSIS — Z12.31 ENCOUNTER FOR SCREENING MAMMOGRAM FOR MALIGNANT NEOPLASM OF BREAST: ICD-10-CM

## 2025-03-31 DIAGNOSIS — F32.A ANXIETY AND DEPRESSION: ICD-10-CM

## 2025-03-31 DIAGNOSIS — E66.01 MORBID OBESITY: ICD-10-CM

## 2025-03-31 DIAGNOSIS — I10 HYPERTENSION, UNSPECIFIED TYPE: Primary | ICD-10-CM

## 2025-03-31 DIAGNOSIS — F41.9 ANXIETY AND DEPRESSION: ICD-10-CM

## 2025-03-31 PROCEDURE — 99214 OFFICE O/P EST MOD 30 MIN: CPT | Performed by: FAMILY MEDICINE

## 2025-03-31 PROCEDURE — G8417 CALC BMI ABV UP PARAM F/U: HCPCS | Performed by: FAMILY MEDICINE

## 2025-03-31 PROCEDURE — 1036F TOBACCO NON-USER: CPT | Performed by: FAMILY MEDICINE

## 2025-03-31 PROCEDURE — 3077F SYST BP >= 140 MM HG: CPT | Performed by: FAMILY MEDICINE

## 2025-03-31 PROCEDURE — G8427 DOCREV CUR MEDS BY ELIG CLIN: HCPCS | Performed by: FAMILY MEDICINE

## 2025-03-31 PROCEDURE — 3079F DIAST BP 80-89 MM HG: CPT | Performed by: FAMILY MEDICINE

## 2025-03-31 RX ORDER — PROPRANOLOL HCL 20 MG
20 TABLET ORAL 3 TIMES DAILY
Qty: 90 TABLET | Refills: 0 | Status: SHIPPED | OUTPATIENT
Start: 2025-03-31 | End: 2025-04-30

## 2025-03-31 SDOH — ECONOMIC STABILITY: FOOD INSECURITY: WITHIN THE PAST 12 MONTHS, THE FOOD YOU BOUGHT JUST DIDN'T LAST AND YOU DIDN'T HAVE MONEY TO GET MORE.: NEVER TRUE

## 2025-03-31 SDOH — ECONOMIC STABILITY: INCOME INSECURITY: IN THE LAST 12 MONTHS, WAS THERE A TIME WHEN YOU WERE NOT ABLE TO PAY THE MORTGAGE OR RENT ON TIME?: YES

## 2025-03-31 SDOH — ECONOMIC STABILITY: FOOD INSECURITY: WITHIN THE PAST 12 MONTHS, YOU WORRIED THAT YOUR FOOD WOULD RUN OUT BEFORE YOU GOT MONEY TO BUY MORE.: NEVER TRUE

## 2025-03-31 SDOH — ECONOMIC STABILITY: TRANSPORTATION INSECURITY
IN THE PAST 12 MONTHS, HAS THE LACK OF TRANSPORTATION KEPT YOU FROM MEDICAL APPOINTMENTS OR FROM GETTING MEDICATIONS?: NO

## 2025-03-31 ASSESSMENT — PATIENT HEALTH QUESTIONNAIRE - PHQ9
10. IF YOU CHECKED OFF ANY PROBLEMS, HOW DIFFICULT HAVE THESE PROBLEMS MADE IT FOR YOU TO DO YOUR WORK, TAKE CARE OF THINGS AT HOME, OR GET ALONG WITH OTHER PEOPLE: VERY DIFFICULT
9. THOUGHTS THAT YOU WOULD BE BETTER OFF DEAD, OR OF HURTING YOURSELF: NOT AT ALL
8. MOVING OR SPEAKING SO SLOWLY THAT OTHER PEOPLE COULD HAVE NOTICED. OR THE OPPOSITE - BEING SO FIDGETY OR RESTLESS THAT YOU HAVE BEEN MOVING AROUND A LOT MORE THAN USUAL: NOT AT ALL
8. MOVING OR SPEAKING SO SLOWLY THAT OTHER PEOPLE COULD HAVE NOTICED. OR THE OPPOSITE, BEING SO FIGETY OR RESTLESS THAT YOU HAVE BEEN MOVING AROUND A LOT MORE THAN USUAL: NOT AT ALL
4. FEELING TIRED OR HAVING LITTLE ENERGY: SEVERAL DAYS
2. FEELING DOWN, DEPRESSED OR HOPELESS: NEARLY EVERY DAY
SUM OF ALL RESPONSES TO PHQ QUESTIONS 1-9: 9
7. TROUBLE CONCENTRATING ON THINGS, SUCH AS READING THE NEWSPAPER OR WATCHING TELEVISION: NOT AT ALL
10. IF YOU CHECKED OFF ANY PROBLEMS, HOW DIFFICULT HAVE THESE PROBLEMS MADE IT FOR YOU TO DO YOUR WORK, TAKE CARE OF THINGS AT HOME, OR GET ALONG WITH OTHER PEOPLE: VERY DIFFICULT
1. LITTLE INTEREST OR PLEASURE IN DOING THINGS: MORE THAN HALF THE DAYS
5. POOR APPETITE OR OVEREATING: SEVERAL DAYS
6. FEELING BAD ABOUT YOURSELF - OR THAT YOU ARE A FAILURE OR HAVE LET YOURSELF OR YOUR FAMILY DOWN: SEVERAL DAYS
3. TROUBLE FALLING OR STAYING ASLEEP: SEVERAL DAYS
2. FEELING DOWN, DEPRESSED OR HOPELESS: NEARLY EVERY DAY
SUM OF ALL RESPONSES TO PHQ QUESTIONS 1-9: 9
SUM OF ALL RESPONSES TO PHQ QUESTIONS 1-9: 9
9. THOUGHTS THAT YOU WOULD BE BETTER OFF DEAD, OR OF HURTING YOURSELF: NOT AT ALL
SUM OF ALL RESPONSES TO PHQ QUESTIONS 1-9: 9
3. TROUBLE FALLING OR STAYING ASLEEP: SEVERAL DAYS
1. LITTLE INTEREST OR PLEASURE IN DOING THINGS: MORE THAN HALF THE DAYS
SUM OF ALL RESPONSES TO PHQ QUESTIONS 1-9: 9
7. TROUBLE CONCENTRATING ON THINGS, SUCH AS READING THE NEWSPAPER OR WATCHING TELEVISION: NOT AT ALL
4. FEELING TIRED OR HAVING LITTLE ENERGY: SEVERAL DAYS
5. POOR APPETITE OR OVEREATING: SEVERAL DAYS
6. FEELING BAD ABOUT YOURSELF - OR THAT YOU ARE A FAILURE OR HAVE LET YOURSELF OR YOUR FAMILY DOWN: SEVERAL DAYS

## 2025-03-31 NOTE — PROGRESS NOTES
Chief Complaint   Patient presents with    Follow-up     Patient presents today for ED from  3/25. Patient states bp is still running high. Patient states she still gets dizzy while she is sitting or standing.         HPI: Ashley Krishna 44 y.o. female presenting for    History of Present Illness  The patient presents for evaluation of elevated blood pressure, anxiety, and depression.    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 Emergency care was sought, where all other parameters were normal except for blood pressure. Blood pressure decreased while waiting in the ER but spiked again upon standing to leave. Blood pressure has been monitored at work, noting that it tends to rise during teaching periods and subsides when relaxed. No headaches or vision changes are reported, but dizziness is experienced. Limited breaks during the day result in inadequate water intake, compensated by consuming large amounts of water at home and on weekends. Blood pressure has not been monitored after consuming significant amounts of water. No fevers, chills, nausea, vomiting, or chest pain are reported. Shortness of breath is noted during episodes of elevated blood pressure. A low-salt diet is maintained, and normal eating and drinking habits are reported. Currently, Norvasc is used for blood pressure management.    Constant feelings of anxiety and depression are reported, attributed to work-related stressors. A history of anxiety dating back to childhood is noted. Performance anxiety is experienced, often mentally rehearsing tasks before performing them, contributing to anxiety. Situational anxiety is also reported, feeling overwhelmed by responsibilities and unable to delegate tasks effectively. A desire to avoid leaving the house due to

## 2025-04-07 ENCOUNTER — OFFICE VISIT (OUTPATIENT)
Age: 45
End: 2025-04-07
Payer: COMMERCIAL

## 2025-04-07 VITALS
SYSTOLIC BLOOD PRESSURE: 130 MMHG | OXYGEN SATURATION: 98 % | BODY MASS INDEX: 42.38 KG/M2 | HEART RATE: 75 BPM | HEIGHT: 67 IN | DIASTOLIC BLOOD PRESSURE: 86 MMHG | WEIGHT: 270 LBS | TEMPERATURE: 97.1 F

## 2025-04-07 DIAGNOSIS — F32.A ANXIETY AND DEPRESSION: Primary | ICD-10-CM

## 2025-04-07 DIAGNOSIS — F41.9 ANXIETY AND DEPRESSION: Primary | ICD-10-CM

## 2025-04-07 PROCEDURE — G8417 CALC BMI ABV UP PARAM F/U: HCPCS | Performed by: FAMILY MEDICINE

## 2025-04-07 PROCEDURE — G8427 DOCREV CUR MEDS BY ELIG CLIN: HCPCS | Performed by: FAMILY MEDICINE

## 2025-04-07 PROCEDURE — 1036F TOBACCO NON-USER: CPT | Performed by: FAMILY MEDICINE

## 2025-04-07 PROCEDURE — 99214 OFFICE O/P EST MOD 30 MIN: CPT | Performed by: FAMILY MEDICINE

## 2025-04-07 RX ORDER — ESCITALOPRAM OXALATE 10 MG/1
10 TABLET ORAL DAILY
Qty: 30 TABLET | Refills: 3 | Status: SHIPPED | OUTPATIENT
Start: 2025-04-07

## 2025-04-07 NOTE — PROGRESS NOTES
responsibilities and unable to delegate tasks effectively. A desire to avoid leaving the house due to anxiety is expressed. Interest in pursuing counseling is indicated.    F/u   Persistent anxiety is reported, which, while not completely alleviated, has improved her functional capacity. Fatigue and transient dizziness, lasting approximately 30 minutes to an hour, are experienced as side effects of her medication. Due to these side effects, medication intake has been reduced from three times daily to twice, once in the morning and once before bedtime. Concerns are expressed about potential exacerbation of these side effects with an increased dosage, particularly in relation to work performance. Blood pressure has not been monitored during episodes of dizziness at school. Despite having a blood pressure cuff in her work bag, usage has been limited due to time constraints. An Apple watch is utilized to monitor her pulse, which has decreased to a desirable range of around 65. Without medication, her pulse rate can exceed 100 even with minimal exertion such as walking to the restroom.     No significant feelings of sadness or depression are reported. Attempts at self-management of symptoms have been made but pharmacological intervention is acknowledged as necessary. Depression remains severe, as evidenced by a lack of motivation to leave the couch over the weekend. However, an improvement in anxiety symptoms is noted. Previously prescribed Zoloft was initially beneficial but eventually led to a flat affect. Prior to Zoloft, Wellbutrin was used but found less effective.    Weight fluctuations are reported, with a decrease to 250 pounds followed by an increase. Acknowledgment of a high carbohydrate intake in the diet is noted.    Current Outpatient Medications   Medication Sig Dispense Refill    escitalopram (LEXAPRO) 10 MG tablet Take 1 tablet by mouth daily 30 tablet 3    propranolol (INDERAL) 20 MG tablet Take 1 tablet

## 2025-04-30 ENCOUNTER — OFFICE VISIT (OUTPATIENT)
Age: 45
End: 2025-04-30
Payer: COMMERCIAL

## 2025-04-30 VITALS
SYSTOLIC BLOOD PRESSURE: 132 MMHG | HEIGHT: 67 IN | TEMPERATURE: 96.9 F | OXYGEN SATURATION: 100 % | WEIGHT: 264 LBS | BODY MASS INDEX: 41.44 KG/M2 | DIASTOLIC BLOOD PRESSURE: 74 MMHG | HEART RATE: 66 BPM

## 2025-04-30 DIAGNOSIS — F41.9 ANXIETY AND DEPRESSION: Primary | ICD-10-CM

## 2025-04-30 DIAGNOSIS — F32.A ANXIETY AND DEPRESSION: Primary | ICD-10-CM

## 2025-04-30 PROCEDURE — G8417 CALC BMI ABV UP PARAM F/U: HCPCS | Performed by: NURSE PRACTITIONER

## 2025-04-30 PROCEDURE — G8427 DOCREV CUR MEDS BY ELIG CLIN: HCPCS | Performed by: NURSE PRACTITIONER

## 2025-04-30 PROCEDURE — 1036F TOBACCO NON-USER: CPT | Performed by: NURSE PRACTITIONER

## 2025-04-30 PROCEDURE — 99214 OFFICE O/P EST MOD 30 MIN: CPT | Performed by: NURSE PRACTITIONER

## 2025-04-30 NOTE — PROGRESS NOTES
[facility] Care  [facility] PRIMARY AND SPECIALTY CARE  [address] [facility] [place] [address]  Dept: [phone]  Dept Fax: [phone]  Loc: [phone]     Subjective  Ashley Krishna, 44 y.o. female Established patient presents today with:  Chief Complaint   Patient presents with    Anxiety     3 week follow up - Lexapro 10 mg  Pt states she is feeling better. Some nausea.       History of Present Illness  The patient presents for a 3-week follow-up on Lexapro.    Nausea was experienced as a side effect of Lexapro, which has been taken since the last visit. Despite this, overall well-being is reported, and no exacerbation of depression has been observed. Sleep is interrupted by awakenings at night, attributed to anxiety. Medication is taken in the evening. No suicidal ideation is endorsed, and diet and hydration status are normal. Previous treatments included Zoloft and Wellbutrin, which were effective at the time, although Zoloft caused a sense of detachment.    A mammogram ordered in 2025 has not yet been scheduled.      Past Medical History:   Diagnosis Date    Anxiety     Depression     Hearing loss     Iron deficiency anemia     Mild intermittent asthma without complication 10/16/2023    Mitral valve prolapse     Obesity      Past Surgical History:   Procedure Laterality Date     SECTION   & 2008    HERNIA REPAIR      umbilical hernia repair at age 2    HYSTERECTOMY, TOTAL ABDOMINAL (CERVIX REMOVED) N/A 3/7/2022    EVALUATION UNDER ANESTHESIA WITH TOTAL ABDOMINAL HYSTERECTOMY WITH BILATERAL SALPINGECTOMY performed by Phillip Orr DO at [facility] OR     Immunization History   Administered Date(s) Administered    COVID-19, MODERNA BLUE border, Primary or Immunocompromised, (age 12y+), IM, 100 mcg/0.5mL 2021    COVID-19, [place] Vaccine, Vaccine Unspecified 2021, 2021    Influenza Virus Vaccine

## 2025-06-03 ENCOUNTER — OFFICE VISIT (OUTPATIENT)
Age: 45
End: 2025-06-03
Payer: COMMERCIAL

## 2025-06-03 VITALS
OXYGEN SATURATION: 99 % | TEMPERATURE: 97.7 F | SYSTOLIC BLOOD PRESSURE: 126 MMHG | HEIGHT: 67 IN | HEART RATE: 94 BPM | WEIGHT: 266 LBS | DIASTOLIC BLOOD PRESSURE: 64 MMHG | BODY MASS INDEX: 41.75 KG/M2

## 2025-06-03 DIAGNOSIS — R63.5 WEIGHT GAIN: ICD-10-CM

## 2025-06-03 DIAGNOSIS — Z13.220 SCREENING FOR LIPID DISORDERS: Primary | ICD-10-CM

## 2025-06-03 DIAGNOSIS — Z13.220 SCREENING FOR LIPID DISORDERS: ICD-10-CM

## 2025-06-03 LAB
ALBUMIN SERPL-MCNC: 4.2 G/DL (ref 3.5–4.6)
ALP SERPL-CCNC: 113 U/L (ref 40–130)
ALT SERPL-CCNC: 20 U/L (ref 0–33)
ANION GAP SERPL CALCULATED.3IONS-SCNC: 11 MEQ/L (ref 9–15)
AST SERPL-CCNC: 23 U/L (ref 0–35)
BASOPHILS # BLD: 0 K/UL (ref 0–0.2)
BASOPHILS NFR BLD: 0.4 %
BILIRUB SERPL-MCNC: <0.2 MG/DL (ref 0.2–0.7)
BUN SERPL-MCNC: 9 MG/DL (ref 6–20)
CALCIUM SERPL-MCNC: 9.3 MG/DL (ref 8.5–9.9)
CHLORIDE SERPL-SCNC: 102 MEQ/L (ref 95–107)
CHOLEST SERPL-MCNC: 189 MG/DL (ref 0–199)
CO2 SERPL-SCNC: 24 MEQ/L (ref 20–31)
CREAT SERPL-MCNC: 0.8 MG/DL (ref 0.5–0.9)
EOSINOPHIL # BLD: 0.2 K/UL (ref 0–0.7)
EOSINOPHIL NFR BLD: 2.1 %
ERYTHROCYTE [DISTWIDTH] IN BLOOD BY AUTOMATED COUNT: 14.9 % (ref 11.5–14.5)
GLOBULIN SER CALC-MCNC: 3.4 G/DL (ref 2.3–3.5)
GLUCOSE SERPL-MCNC: 109 MG/DL (ref 70–99)
HCT VFR BLD AUTO: 42.9 % (ref 37–47)
HDLC SERPL-MCNC: 56 MG/DL (ref 40–59)
HGB BLD-MCNC: 13.7 G/DL (ref 12–16)
LDLC SERPL CALC-MCNC: 115 MG/DL (ref 0–129)
LYMPHOCYTES # BLD: 3 K/UL (ref 1–4.8)
LYMPHOCYTES NFR BLD: 30.4 %
MCH RBC QN AUTO: 26.3 PG (ref 27–31.3)
MCHC RBC AUTO-ENTMCNC: 31.9 % (ref 33–37)
MCV RBC AUTO: 82.5 FL (ref 79.4–94.8)
MONOCYTES # BLD: 0.5 K/UL (ref 0.2–0.8)
MONOCYTES NFR BLD: 5.4 %
NEUTROPHILS # BLD: 6 K/UL (ref 1.4–6.5)
NEUTS SEG NFR BLD: 61.2 %
PLATELET # BLD AUTO: 307 K/UL (ref 130–400)
POTASSIUM SERPL-SCNC: 4.3 MEQ/L (ref 3.4–4.9)
PROT SERPL-MCNC: 7.6 G/DL (ref 6.3–8)
RBC # BLD AUTO: 5.2 M/UL (ref 4.2–5.4)
SODIUM SERPL-SCNC: 137 MEQ/L (ref 135–144)
TRIGL SERPL-MCNC: 92 MG/DL (ref 0–150)
TSH REFLEX: 1.18 UIU/ML (ref 0.44–3.86)
WBC # BLD AUTO: 9.8 K/UL (ref 4.8–10.8)

## 2025-06-03 PROCEDURE — 99214 OFFICE O/P EST MOD 30 MIN: CPT | Performed by: NURSE PRACTITIONER

## 2025-06-03 PROCEDURE — G8417 CALC BMI ABV UP PARAM F/U: HCPCS | Performed by: NURSE PRACTITIONER

## 2025-06-03 PROCEDURE — G8427 DOCREV CUR MEDS BY ELIG CLIN: HCPCS | Performed by: NURSE PRACTITIONER

## 2025-06-03 PROCEDURE — 1036F TOBACCO NON-USER: CPT | Performed by: NURSE PRACTITIONER

## 2025-06-03 NOTE — PROGRESS NOTES
[facility] Primary Care  [facility] PRIMARY AND SPECIALTY CARE  [address] [facility] [address]  Dept: [phone]  Dept Fax: [phone]  Loc: [phone]     Jai Krishna, 44 y.o. female Established patient presents today with:  Chief Complaint   Patient presents with    Anxiety & Depression     4 week - Lexapro 10 mg  Has not noticed any changes since last time she was seen. Is sleeping better.        History of Present Illness  The patient presents for a follow-up on her mood and weight management.    She reports an improvement in her sleep quality, although there has been no significant enhancement in her mood. She does not experience anxiety and believes the current medication dosage is appropriate. She recalls feeling more stressed prior to initiating the medication but noticed a reduction in stress levels within a week or two of starting the treatment, which she found sufficient to manage her work responsibilities. She anticipates that her body will adjust and return to its normal state by mid-August. She has a 90-day supply of her medication at home.    She has been grappling with weight gain since the onset of the COVID-19 pandemic, which has escalated over the past month. As a , her schedule in May was particularly hectic, limiting her gym attendance. Despite this, she feels that even regular gym visits have not significantly aided her weight loss efforts. She is currently in the perimenopausal phase and has not undergone any blood work since the previous summer. Her last meal was half an iced coffee with soy milk. She expresses concern about potential side effects of weight loss medications, particularly those that could affect her blood pressure or act as stimulants. She is unfamiliar with injectable weight loss treatments. She acknowledges that obesity is prevalent on her maternal side, with her

## 2025-06-04 ENCOUNTER — RESULTS FOLLOW-UP (OUTPATIENT)
Age: 45
End: 2025-06-04

## 2025-06-04 LAB
ESTIMATED AVERAGE GLUCOSE: 120 MG/DL
HBA1C MFR BLD: 5.8 % (ref 4–6)

## 2025-06-18 ENCOUNTER — HOSPITAL ENCOUNTER (OUTPATIENT)
Dept: WOMENS IMAGING | Age: 45
Discharge: HOME | End: 2025-06-20
Attending: FAMILY MEDICINE
Payer: COMMERCIAL

## 2025-06-18 DIAGNOSIS — Z12.31 ENCOUNTER FOR SCREENING MAMMOGRAM FOR MALIGNANT NEOPLASM OF BREAST: ICD-10-CM

## 2025-06-18 PROCEDURE — 77063 BREAST TOMOSYNTHESIS BI: CPT

## 2025-06-19 ENCOUNTER — RESULTS FOLLOW-UP (OUTPATIENT)
Age: 45
End: 2025-06-19

## 2025-06-19 DIAGNOSIS — I10 HYPERTENSION, UNSPECIFIED TYPE: ICD-10-CM

## 2025-06-19 RX ORDER — AMLODIPINE BESYLATE 10 MG/1
10 TABLET ORAL DAILY
Qty: 90 TABLET | Refills: 0 | Status: SHIPPED | OUTPATIENT
Start: 2025-06-19

## 2025-07-01 ENCOUNTER — OFFICE VISIT (OUTPATIENT)
Age: 45
End: 2025-07-01
Payer: COMMERCIAL

## 2025-07-01 ENCOUNTER — PATIENT MESSAGE (OUTPATIENT)
Age: 45
End: 2025-07-01

## 2025-07-01 VITALS
WEIGHT: 264 LBS | SYSTOLIC BLOOD PRESSURE: 126 MMHG | HEART RATE: 87 BPM | DIASTOLIC BLOOD PRESSURE: 78 MMHG | BODY MASS INDEX: 41.44 KG/M2 | OXYGEN SATURATION: 99 % | TEMPERATURE: 98.4 F | HEIGHT: 67 IN

## 2025-07-01 DIAGNOSIS — Z76.89 ENCOUNTER FOR WEIGHT MANAGEMENT: ICD-10-CM

## 2025-07-01 PROCEDURE — 1036F TOBACCO NON-USER: CPT | Performed by: NURSE PRACTITIONER

## 2025-07-01 PROCEDURE — 99214 OFFICE O/P EST MOD 30 MIN: CPT | Performed by: NURSE PRACTITIONER

## 2025-07-01 PROCEDURE — G8427 DOCREV CUR MEDS BY ELIG CLIN: HCPCS | Performed by: NURSE PRACTITIONER

## 2025-07-01 PROCEDURE — G8417 CALC BMI ABV UP PARAM F/U: HCPCS | Performed by: NURSE PRACTITIONER

## 2025-07-01 NOTE — PROGRESS NOTES
[facility] Primary Care  [facility] PRIMARY AND SPECIALTY CARE  [address] [facility] [address]  Dept: [phone]  Dept Fax: [phone]  Loc: [phone]     Subjective  Ashley Krishna, 45 y.o. female Established patient presents today with:  Chief Complaint   Patient presents with    Medication Check     Lexapro 10 mg  States she is still feeling about the same    Weight Management     Semiglutide 0.3  Has been on it for a week       History of Present Illness  The patient presents for evaluation of medication management and colorectal cancer screening.    She has been on an injection regimen for a week, which she reports as tolerable. Initially, she experienced lightheadedness, but this was alleviated by increasing her water intake. She also mentions occasional headaches, but overall, she feels well. She is not experiencing shortness of breath or chest pain. Her Lexapro treatment continues to be effective.    She has never undergone a colorectal cancer screening. In 2014, she was diagnosed with severe iron deficiency anemia, which led to comprehensive testing.      Past Medical History:   Diagnosis Date    Anxiety     Depression     Hearing loss     Iron deficiency anemia     Mild intermittent asthma without complication 10/16/2023    Mitral valve prolapse     Obesity      Past Surgical History:   Procedure Laterality Date     SECTION   & 2008    HERNIA REPAIR      umbilical hernia repair at age 2    HYSTERECTOMY, TOTAL ABDOMINAL (CERVIX REMOVED) N/A 3/7/2022    EVALUATION UNDER ANESTHESIA WITH TOTAL ABDOMINAL HYSTERECTOMY WITH BILATERAL SALPINGECTOMY performed by Phillip Orr DO at [facility] OR     Immunization History   Administered Date(s) Administered    COVID-19, MODERNA BLUE border, Primary or Immunocompromised, (age 12y+), IM, 100 mcg/0.5mL 2021    COVID-19, [place] Vaccine, Vaccine Unspecified

## 2025-08-06 DIAGNOSIS — F41.9 ANXIETY AND DEPRESSION: ICD-10-CM

## 2025-08-06 DIAGNOSIS — F32.A ANXIETY AND DEPRESSION: ICD-10-CM

## 2025-08-06 RX ORDER — ESCITALOPRAM OXALATE 10 MG/1
10 TABLET ORAL DAILY
Qty: 90 TABLET | Refills: 1 | Status: SHIPPED | OUTPATIENT
Start: 2025-08-06

## 2025-08-12 ENCOUNTER — OFFICE VISIT (OUTPATIENT)
Age: 45
End: 2025-08-12
Payer: COMMERCIAL

## 2025-08-12 VITALS
HEART RATE: 90 BPM | SYSTOLIC BLOOD PRESSURE: 120 MMHG | OXYGEN SATURATION: 99 % | HEIGHT: 67 IN | TEMPERATURE: 97.6 F | BODY MASS INDEX: 40.34 KG/M2 | WEIGHT: 257 LBS | DIASTOLIC BLOOD PRESSURE: 70 MMHG

## 2025-08-12 DIAGNOSIS — Z12.11 SCREENING FOR COLON CANCER: Primary | ICD-10-CM

## 2025-08-12 PROCEDURE — 99214 OFFICE O/P EST MOD 30 MIN: CPT | Performed by: NURSE PRACTITIONER

## 2025-08-12 PROCEDURE — G8417 CALC BMI ABV UP PARAM F/U: HCPCS | Performed by: NURSE PRACTITIONER

## 2025-08-12 PROCEDURE — 1036F TOBACCO NON-USER: CPT | Performed by: NURSE PRACTITIONER

## 2025-08-12 PROCEDURE — G8427 DOCREV CUR MEDS BY ELIG CLIN: HCPCS | Performed by: NURSE PRACTITIONER

## 2025-08-31 ENCOUNTER — PATIENT MESSAGE (OUTPATIENT)
Age: 45
End: 2025-08-31

## 2025-09-02 DIAGNOSIS — Z12.11 SCREENING FOR COLON CANCER: Primary | ICD-10-CM

## 2025-09-06 LAB — NONINV COLON CA DNA+OCC BLD SCRN STL QL: NEGATIVE
